# Patient Record
Sex: MALE | Race: WHITE | NOT HISPANIC OR LATINO | Employment: FULL TIME | URBAN - METROPOLITAN AREA
[De-identification: names, ages, dates, MRNs, and addresses within clinical notes are randomized per-mention and may not be internally consistent; named-entity substitution may affect disease eponyms.]

---

## 2024-01-11 ENCOUNTER — EVALUATION (OUTPATIENT)
Dept: PHYSICAL THERAPY | Facility: CLINIC | Age: 44
End: 2024-01-11
Payer: COMMERCIAL

## 2024-01-11 DIAGNOSIS — S39.012A LUMBAR STRAIN: Primary | ICD-10-CM

## 2024-01-11 PROCEDURE — 97140 MANUAL THERAPY 1/> REGIONS: CPT | Mod: GP | Performed by: PHYSICAL THERAPIST

## 2024-01-11 PROCEDURE — 97161 PT EVAL LOW COMPLEX 20 MIN: CPT | Mod: GP | Performed by: PHYSICAL THERAPIST

## 2024-01-11 ASSESSMENT — ENCOUNTER SYMPTOMS
DEPRESSION: 0
OCCASIONAL FEELINGS OF UNSTEADINESS: 0
LOSS OF SENSATION IN FEET: 0

## 2024-01-11 NOTE — PROGRESS NOTES
Physical Therapy    Physical Therapy Evaluation    Patient Name: Carlos A Workman  MRN: [id number]  Today's Date: 01/11/24  Time Calculation  Start Time: 0900  Stop Time: 1000  Time Calculation (min): 60 min     Insurance:  Visit number: 1 of 12 on POC, 40 V per year MMO   Insurance Type: MMO. 40 V per year      Therapy diagnoses:   1. Lumbar strain  Follow Up In Physical Therapy           General:  Reason for visit: LBP flare.  Has noted LBP throughout the past 5 years and is managing it for the most part with exxercise and body mechanics awareness.  Pain is becoming more persistent in the past few months   Referred by: Direct Access, Pt known to this clinician  Next MD appt:  February 2024    Precautions:  None   Consents to IMDN with no noted contraindications   Fall Risk: None     Assessment:   Mechanical LBP, non radicular  No particular flexion or extension bias  Worse as in the am, better with movement throughout the day.  Trialed compression without significant improvement    Impairments: Pain, Active ROM, Strength, Flexibility, and Decreased functional level    Functional Limitations: Reaching, Lifting, Sleeping, Working, and Standing            Plan:  Plan of care was developed with input and agreement by the patient.  1-2 times for 4-6 weeks     Recommended Treatment:  Manual therapy, Home program instruction and progression, Neuromuscular re-education, Therapeutic activities, Self care and home management, Instruction in activity modification, Electrical stimulation, Dry Needling, and Aquatic therapy    Rehab Potential: Good to achieve goals.    Goals:    Decrease subjective report of low back  pain .  Increase flexibility of johnny hamstrings and trunk musculature to allow pain free functional mobility  .  Increase strength of trunk stabilizers to maximize performance of functional activities including lifting, carrying , standing .  Return to exercise for an active healthy lifestyle and injury  "prevention.  Return to prior level of function including: tolerance of am activities without pain  .  Max pain report 2/10 with standing activities for >/= to 30 minutes    Patient Goal: Stand without pain for required activities    Subjective:  - CC:  low back pain that variable in intensity. Cannot stand  in stationary position for longer than 10 minutes without  - SOLO:  chronic LBP - 5 yrs ago intense pain requiring hospitalization. Indep management of pain in the past years. Worse over past 3 months  - DOI: chronic  - PAIN - Location: central low back, L2 - 5 Worst(past 24 hours): 7-8/10 \" sharp\"  Least(past 24 hours):   1-2 after warmed up   - PAIN - Alleviating:sitting after prolonged standing  Aggravating: upon waking in the am and with mid to end range flexion or extension of trunk  - CURRENT MEDICAL MANAGEMENT: plans to see primary car physician next month and will follow up sooner if needed  - PLOF: fully independent and able to work and complete daily activities but with intermittent pain and caution  - FUNCTIONAL LIMITATIONS: end range trunk flexion and extension activities ( putting on shoes, twisting ) prolonged standing increases pain  - LIVING ENVIRONMENT: no issues  - WORK:  / paramedic  - EXERCISE:consistently does core exercises to manage pain      Medical History Form: Reviewed (scanned into chart)  Denies bowel or bladder symptoms, no active medical concerns      Objective:   MAUREEN LE AROM and Strength WNL.  Able to walk on heels and toes  Balance WNL - no deficits noted  Trunk AROM: WFL - however hesitant and guarded past 1/4 of range  At end ranges of trunk AROM pain is at times sharp and stabbing     Sensation intact MAUREEN LE    Flexibility: MOD tight hamstrings MAUREEN   Posture: seated - rounded shoulders and slouched at edge of bed - corrected with cueing  Standing posture good in clinic today but pt notes he tends to tilt pelvis in stand increasing discomfort.      Palpation:  + " "tightness note L>R lumbar and lower thoracic paraspinals.  No tenderness reported R/L QL  Level PSIS in sit and prone, level iliac crests in stand  Most tenderness noted L3-4 region L this date            Outcome Measures:  Other Measures  Oswestry Disablity Index (CHIQUITA): 18%     Treatment Performed: (\"NP\" = Not Performed)       Education:  Waking ritual warm up exercise eugenia pose and trunk ext  press ups as chanel  Monitor and be aware of posture  Body mechanics review related to static positions   KT tape to stay on 2 - 3 days unless irritation occurs  Regular activity and OTC anti inflammatories  if needed after manual interventions this date     Manual Therapy:     15 minutes  STM to MAUREEN thoracic ad lumbar paraspinals  Trial spinal compression in sitting  no improvement noted   IMDN with verbal consent and review of contraindications   30 mm  x 3 to R/L PSIS, L4,5 paraspinals and R/L iliac crest cluneal region.   Chanel IMDN without noted or reported adverse reactions  KT tape paraspinals and iliac crests MAUREEN                Evaluation Complexity: Low: 30 minutes; Re-Evaluation:   minutes     "

## 2024-01-15 ENCOUNTER — TREATMENT (OUTPATIENT)
Dept: PHYSICAL THERAPY | Facility: CLINIC | Age: 44
End: 2024-01-15
Payer: COMMERCIAL

## 2024-01-15 DIAGNOSIS — S39.012D STRAIN OF LUMBAR REGION, SUBSEQUENT ENCOUNTER: Primary | Chronic | ICD-10-CM

## 2024-01-15 PROCEDURE — 97140 MANUAL THERAPY 1/> REGIONS: CPT | Mod: GP | Performed by: PHYSICAL THERAPIST

## 2024-01-16 NOTE — PROGRESS NOTES
"                                                                                                                         PHYSICAL THERAPY TREATMENT NOTE    Patient Name:  Carlos A Workman   Patient MRN: [id number]  Date: 01/15/24  Time Calculation  Start Time: 1115  Stop Time: 1135  Time Calculation (min): 20 min          Insurance:    Visit number: 2 of 12 - 16 on POC  Insurance Type: 40 V per year MMO  Authorization or Plan of Care date Range: NA    Therapy diagnoses:   1. Strain of lumbar region, subsequent encounter             General:  Reason for visit: follow PT for LBP    Referred by: direct access  Next MD appt:  Feb 2024    Assessment:  LBP improving per pt report. Notes less intense pain, improved AM symptoms, and less discomfort with functional mobility.  Does note some pain after prolonged activities with flexed trunk - but nted resolution of discomfort in less time    Plan:  Pt to monitor symptoms with functional activities and will call as needed for symptom management      Precautions:  none  Fall Risk: none    Subjective:   Patient reports less intense symptoms - no # given this date. Notes less am pain and better tolerance to functional activities throughout the day    Pain (0-10): not rated this date   HEP adherence / understanding: yes - continues with core ex and increased awareness of body mechanics    Objective:  AROM lumbar WFL this date    Treatment Performed: (\"NP\" = Not Performed)     Therapeutic Exercise:     5minutes  Tband resistance UE info multiple angles in functional squat position    Manual Therapy:     15 minutes  STM MAUREEN thoracic/ lumbar paraspinals - less tension noticed this date  - clear minor triggerbands and minor HTP/CD L>R L3 region  IMDN 3 x 50 mm R/L iliac crests, 3 x 30 mm R/L PSIS, 3 x 30 mm L4 -5 region   KT tape for inhibition paraspinals only   Issued green / blue / black tband for functionalcore work in squat as above           Education:         minutes  HEP - core " ex as noted and continue with existing program

## 2024-07-12 ENCOUNTER — TREATMENT (OUTPATIENT)
Dept: PHYSICAL THERAPY | Facility: CLINIC | Age: 44
End: 2024-07-12

## 2024-07-12 DIAGNOSIS — S39.012D STRAIN OF LUMBAR REGION, SUBSEQUENT ENCOUNTER: Primary | Chronic | ICD-10-CM

## 2024-07-12 PROCEDURE — 4200000004 HC PT PHASE II 15 MIN CHG: Mod: GP | Performed by: PHYSICAL THERAPIST

## 2024-07-12 ASSESSMENT — ENCOUNTER SYMPTOMS
LOSS OF SENSATION IN FEET: 0
DEPRESSION: 0
OCCASIONAL FEELINGS OF UNSTEADINESS: 0

## 2024-07-12 NOTE — PROGRESS NOTES
"                                                                                                                         PHYSICAL THERAPY TREATMENT NOTE    Patient Name:  Carlos A Workman   Patient MRN: [id number]  Date: 07/12/24    Time Calculation  Start Time: 1000  Stop Time: 1020  Time Calculation (min): 20 min                     Phase II and Package Pay  Phase II program time: Non-Billable: 20       Insurance:    Visit number: 1  Insurance Type:: Phase 2 self pay   Authorization or Plan of Care date Range: NA     Therapy diagnoses:   1. Strain of lumbar region, subsequent encounter          Low Back pain with back spasm     General:  Reason for visit: Phase 2 DN   Referred by: self  Next MD appt:  none at this time     Assessment:  Chanel session with no evidence or report of adverse reaction. Pt reported reduction in tightness and discomfort and improved AROM after session     Plan:  Pt will follow up with clinic as needed for IMDN sessions or formal PT if condition worsens.       Precautions:  None noted  Fall Risk: none    Subjective:   Patient reports onset R side LBP - insidious - yesterday when getting up from a chair    Pain (0-10): Ache - better this am due to taking muscle relaxer.  No # given    HEP adherence / understanding: Pt has great understanding of core strengthening ex     Objective:  Here for phase 2 appt - know to service for this dxSEan's    Treatment Performed: (\"NP\" = Not Performed)     IMDN:   The rationale, potential benefits, and risks for dry needling were discussed with Carlos A Workman  The consent to dry needle was signed.  His questions were answered and He agreed to dry needling treatment today.   Pt in prone over pillow positin for palpatoin and assessment of soft tissues R/L LB region .    Skin prepped and Needles inserted R/L PSIS, R//L iliac crest ridge, R/L LS paraspinals L4,5,S1                                       "

## 2025-02-13 ENCOUNTER — APPOINTMENT (OUTPATIENT)
Dept: PHYSICAL THERAPY | Facility: CLINIC | Age: 45
End: 2025-02-13

## 2025-02-13 DIAGNOSIS — S39.012D STRAIN OF LUMBAR REGION, SUBSEQUENT ENCOUNTER: Primary | Chronic | ICD-10-CM

## 2025-02-13 PROCEDURE — 4200000004 HC PT PHASE II 15 MIN CHG: Mod: GP | Performed by: PHYSICAL THERAPIST

## 2025-02-17 ASSESSMENT — ENCOUNTER SYMPTOMS
DEPRESSION: 0
OCCASIONAL FEELINGS OF UNSTEADINESS: 0
LOSS OF SENSATION IN FEET: 0

## 2025-02-17 NOTE — PROGRESS NOTES
Physical Therapy                                                                                                                             PHYSICAL THERAPY TREATMENT NOTE    Patient Name:  Carlos A Workman   Patient MRN: [id number]  Date: 02/17/25         Phase 2        Therapy diagnoses:   Problem List Items Addressed This Visit             ICD-10-CM    Lumbar strain - Primary (Chronic) S39.012A        General:  Reason for visit: Phase 2 session - LBP flared in past few days    Referred by: SELF  Next MD appt:  N/A    Assessment:  Patient tolerated treatment well with no adverse reactions noted or reported.     Chanel IMDN session without evidence of discomfort/ complication/ adverse reaction      Plan:  Pt will follow up as needed with phase 2 appt     Precautions:  none  Fall Risk:none    Subjective:   Patient reports increased pain in LB region - primarily R side this instance.  Denies radiating symptoms, Bowel / Bladder changes and would like to proceed with DN session to address pain       Objective:  Minimal tissue tension noted - s0me R side paraspinals.  No particular trigger points noted          Treatment Performed    IMDN to L 4 - 5 paraspinals 4 x 30 mm needles  IMDN to iliac crest region R/L 4 x 30 mm needles each side   IMDN to R/L PSIS 3 x 30 mm needles each side.   KT tape applied to paraspinals following session

## 2025-04-02 ENCOUNTER — TELEPHONE (OUTPATIENT)
Dept: PHYSICAL THERAPY | Facility: CLINIC | Age: 45
End: 2025-04-02